# Patient Record
Sex: MALE | Race: WHITE
[De-identification: names, ages, dates, MRNs, and addresses within clinical notes are randomized per-mention and may not be internally consistent; named-entity substitution may affect disease eponyms.]

---

## 2017-04-21 ENCOUNTER — HOSPITAL ENCOUNTER (OUTPATIENT)
Dept: HOSPITAL 8 - CFH | Age: 57
Discharge: HOME | End: 2017-04-21
Attending: PHYSICIAN ASSISTANT
Payer: MEDICAID

## 2017-04-21 DIAGNOSIS — M40.294: ICD-10-CM

## 2017-04-21 DIAGNOSIS — S22.030D: Primary | ICD-10-CM
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
## 2017-04-21 DIAGNOSIS — S92.344A: ICD-10-CM

## 2017-04-21 DIAGNOSIS — S22.050D: ICD-10-CM

## 2017-04-21 DIAGNOSIS — F32.9: ICD-10-CM

## 2017-04-21 DIAGNOSIS — S92.334A: ICD-10-CM

## 2017-04-21 DIAGNOSIS — M51.44: ICD-10-CM

## 2017-04-21 DIAGNOSIS — S22.040D: ICD-10-CM

## 2017-04-21 PROCEDURE — 77080 DXA BONE DENSITY AXIAL: CPT

## 2017-04-21 PROCEDURE — 72146 MRI CHEST SPINE W/O DYE: CPT

## 2017-06-01 ENCOUNTER — HOSPITAL ENCOUNTER (EMERGENCY)
Dept: HOSPITAL 8 - ED | Age: 57
Discharge: HOME | End: 2017-06-01
Payer: MEDICAID

## 2017-06-01 VITALS — SYSTOLIC BLOOD PRESSURE: 133 MMHG | DIASTOLIC BLOOD PRESSURE: 82 MMHG

## 2017-06-01 VITALS — HEIGHT: 64 IN | BODY MASS INDEX: 28.04 KG/M2 | WEIGHT: 164.24 LBS
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
## 2017-06-01 DIAGNOSIS — S60.416A: Primary | ICD-10-CM

## 2017-06-01 DIAGNOSIS — S60.414A: ICD-10-CM
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
## 2017-06-01 DIAGNOSIS — F17.210: ICD-10-CM

## 2017-06-01 DIAGNOSIS — S09.90XA: ICD-10-CM
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
## 2017-06-01 LAB
AST SERPL-CCNC: 12 U/L (ref 15–37)
BUN SERPL-MCNC: 14 MG/DL (ref 7–18)

## 2017-06-01 PROCEDURE — 80053 COMPREHEN METABOLIC PANEL: CPT

## 2017-06-01 PROCEDURE — 83970 ASSAY OF PARATHORMONE: CPT

## 2017-06-01 PROCEDURE — 82306 VITAMIN D 25 HYDROXY: CPT

## 2017-06-01 PROCEDURE — 99284 EMERGENCY DEPT VISIT MOD MDM: CPT

## 2017-06-01 PROCEDURE — 85025 COMPLETE CBC W/AUTO DIFF WBC: CPT

## 2017-06-01 PROCEDURE — 36415 COLL VENOUS BLD VENIPUNCTURE: CPT

## 2017-12-12 ENCOUNTER — HOSPITAL ENCOUNTER (EMERGENCY)
Dept: HOSPITAL 8 - ED | Age: 57
Discharge: HOME | End: 2017-12-12
Payer: MEDICAID

## 2017-12-12 VITALS — DIASTOLIC BLOOD PRESSURE: 78 MMHG | SYSTOLIC BLOOD PRESSURE: 114 MMHG

## 2017-12-12 VITALS — BODY MASS INDEX: 20.68 KG/M2 | HEIGHT: 68 IN | WEIGHT: 136.47 LBS

## 2017-12-12 DIAGNOSIS — M79.671: ICD-10-CM

## 2017-12-12 DIAGNOSIS — G40.909: ICD-10-CM

## 2017-12-12 DIAGNOSIS — M79.672: Primary | ICD-10-CM

## 2017-12-12 PROCEDURE — 99281 EMR DPT VST MAYX REQ PHY/QHP: CPT

## 2017-12-20 ENCOUNTER — HOSPITAL ENCOUNTER (EMERGENCY)
Dept: HOSPITAL 8 - ED | Age: 57
Discharge: HOME | End: 2017-12-20
Payer: MEDICAID

## 2017-12-20 VITALS — WEIGHT: 138.89 LBS | BODY MASS INDEX: 22.32 KG/M2 | HEIGHT: 66 IN

## 2017-12-20 VITALS — SYSTOLIC BLOOD PRESSURE: 110 MMHG | DIASTOLIC BLOOD PRESSURE: 65 MMHG

## 2017-12-20 DIAGNOSIS — F15.10: ICD-10-CM

## 2017-12-20 DIAGNOSIS — R53.83: Primary | ICD-10-CM

## 2017-12-20 DIAGNOSIS — M81.0: ICD-10-CM

## 2017-12-20 DIAGNOSIS — G40.909: ICD-10-CM

## 2017-12-20 PROCEDURE — 99283 EMERGENCY DEPT VISIT LOW MDM: CPT

## 2017-12-22 ENCOUNTER — HOSPITAL ENCOUNTER (EMERGENCY)
Dept: HOSPITAL 8 - ED | Age: 57
Discharge: HOME | End: 2017-12-22
Payer: MEDICAID

## 2017-12-22 VITALS — DIASTOLIC BLOOD PRESSURE: 80 MMHG | SYSTOLIC BLOOD PRESSURE: 118 MMHG

## 2017-12-22 VITALS — HEIGHT: 67 IN | BODY MASS INDEX: 21.11 KG/M2 | WEIGHT: 134.48 LBS

## 2017-12-22 DIAGNOSIS — G40.909: Primary | ICD-10-CM

## 2017-12-22 DIAGNOSIS — Z76.0: ICD-10-CM

## 2017-12-22 PROCEDURE — 99284 EMERGENCY DEPT VISIT MOD MDM: CPT

## 2017-12-23 ENCOUNTER — HOSPITAL ENCOUNTER (EMERGENCY)
Dept: HOSPITAL 8 - ED | Age: 57
Discharge: HOME | End: 2017-12-23
Payer: MEDICAID

## 2017-12-23 VITALS — SYSTOLIC BLOOD PRESSURE: 129 MMHG | DIASTOLIC BLOOD PRESSURE: 74 MMHG

## 2017-12-23 VITALS — BODY MASS INDEX: 20.83 KG/M2 | HEIGHT: 67 IN | WEIGHT: 132.72 LBS

## 2017-12-23 DIAGNOSIS — Z76.0: Primary | ICD-10-CM

## 2017-12-23 DIAGNOSIS — F17.200: ICD-10-CM

## 2017-12-23 DIAGNOSIS — G40.909: ICD-10-CM

## 2017-12-23 PROCEDURE — 99282 EMERGENCY DEPT VISIT SF MDM: CPT

## 2018-07-30 ENCOUNTER — HOSPITAL ENCOUNTER (EMERGENCY)
Dept: HOSPITAL 8 - ED | Age: 58
Discharge: HOME | End: 2018-07-30
Payer: MEDICAID

## 2018-07-30 VITALS — WEIGHT: 146.17 LBS | HEIGHT: 67 IN | BODY MASS INDEX: 22.94 KG/M2

## 2018-07-30 VITALS — DIASTOLIC BLOOD PRESSURE: 66 MMHG | SYSTOLIC BLOOD PRESSURE: 112 MMHG

## 2018-07-30 DIAGNOSIS — Z76.0: Primary | ICD-10-CM

## 2018-07-30 DIAGNOSIS — G40.909: ICD-10-CM

## 2018-07-30 PROCEDURE — 99283 EMERGENCY DEPT VISIT LOW MDM: CPT

## 2018-08-04 ENCOUNTER — HOSPITAL ENCOUNTER (EMERGENCY)
Dept: HOSPITAL 8 - ED | Age: 58
Discharge: HOME | End: 2018-08-04
Payer: MEDICAID

## 2018-08-04 VITALS — BODY MASS INDEX: 23.1 KG/M2 | HEIGHT: 66 IN | WEIGHT: 143.74 LBS

## 2018-08-04 VITALS — SYSTOLIC BLOOD PRESSURE: 132 MMHG | DIASTOLIC BLOOD PRESSURE: 87 MMHG

## 2018-08-04 DIAGNOSIS — F15.20: Primary | ICD-10-CM

## 2018-08-04 DIAGNOSIS — F17.210: ICD-10-CM

## 2018-08-04 DIAGNOSIS — Z76.0: ICD-10-CM

## 2018-08-04 DIAGNOSIS — G40.909: ICD-10-CM

## 2018-08-04 PROCEDURE — 99406 BEHAV CHNG SMOKING 3-10 MIN: CPT

## 2018-08-04 PROCEDURE — 99283 EMERGENCY DEPT VISIT LOW MDM: CPT

## 2018-08-05 ENCOUNTER — HOSPITAL ENCOUNTER (EMERGENCY)
Dept: HOSPITAL 8 - ED | Age: 58
Discharge: HOME | End: 2018-08-05
Payer: MEDICAID

## 2018-08-05 VITALS — BODY MASS INDEX: 23.91 KG/M2 | HEIGHT: 65 IN | WEIGHT: 143.52 LBS

## 2018-08-05 VITALS — SYSTOLIC BLOOD PRESSURE: 120 MMHG | DIASTOLIC BLOOD PRESSURE: 77 MMHG

## 2018-08-05 DIAGNOSIS — G40.909: Primary | ICD-10-CM

## 2018-08-05 DIAGNOSIS — Z76.0: ICD-10-CM

## 2018-08-05 DIAGNOSIS — F17.200: ICD-10-CM

## 2018-08-05 PROCEDURE — 99283 EMERGENCY DEPT VISIT LOW MDM: CPT

## 2018-11-01 ENCOUNTER — HOSPITAL ENCOUNTER (EMERGENCY)
Dept: HOSPITAL 8 - ED | Age: 58
Discharge: HOME | End: 2018-11-01
Payer: MEDICAID

## 2018-11-01 VITALS — BODY MASS INDEX: 26.12 KG/M2 | WEIGHT: 176.37 LBS | HEIGHT: 69 IN

## 2018-11-01 VITALS — SYSTOLIC BLOOD PRESSURE: 137 MMHG | DIASTOLIC BLOOD PRESSURE: 78 MMHG

## 2018-11-01 DIAGNOSIS — F20.9: ICD-10-CM

## 2018-11-01 DIAGNOSIS — R10.84: ICD-10-CM

## 2018-11-01 DIAGNOSIS — Z72.9: ICD-10-CM

## 2018-11-01 DIAGNOSIS — R11.2: Primary | ICD-10-CM

## 2018-11-01 DIAGNOSIS — R19.7: ICD-10-CM

## 2018-11-01 LAB
ALBUMIN SERPL-MCNC: 3.6 G/DL (ref 3.4–5)
ALP SERPL-CCNC: 120 U/L (ref 45–117)
ALT SERPL-CCNC: 38 U/L (ref 12–78)
ANION GAP SERPL CALC-SCNC: 9 MMOL/L (ref 5–15)
BASOPHILS # BLD AUTO: 0.02 X10^3/UL (ref 0–0.1)
BASOPHILS NFR BLD AUTO: 0 % (ref 0–1)
BILIRUB SERPL-MCNC: 0.3 MG/DL (ref 0.2–1)
CALCIUM SERPL-MCNC: 8.4 MG/DL (ref 8.5–10.1)
CHLORIDE SERPL-SCNC: 104 MMOL/L (ref 98–107)
CREAT SERPL-MCNC: 0.83 MG/DL (ref 0.7–1.3)
CULTURE INDICATED?: NO
EOSINOPHIL # BLD AUTO: 0 X10^3/UL (ref 0–0.4)
EOSINOPHIL NFR BLD AUTO: 0 % (ref 1–7)
ERYTHROCYTE [DISTWIDTH] IN BLOOD BY AUTOMATED COUNT: 14 % (ref 9.4–14.8)
LYMPHOCYTES # BLD AUTO: 1.4 X10^3/UL (ref 1–3.4)
LYMPHOCYTES NFR BLD AUTO: 24 % (ref 22–44)
MCH RBC QN AUTO: 31.3 PG (ref 27.5–34.5)
MCHC RBC AUTO-ENTMCNC: 33.9 G/DL (ref 33.2–36.2)
MCV RBC AUTO: 92.1 FL (ref 81–97)
MD: NO
MICROSCOPIC: (no result)
MONOCYTES # BLD AUTO: 0.28 X10^3/UL (ref 0.2–0.8)
MONOCYTES NFR BLD AUTO: 5 % (ref 2–9)
NEUTROPHILS # BLD AUTO: 4.17 X10^3/UL (ref 1.8–6.8)
NEUTROPHILS NFR BLD AUTO: 71 % (ref 42–75)
PLATELET # BLD AUTO: 339 X10^3/UL (ref 130–400)
PMV BLD AUTO: 7.8 FL (ref 7.4–10.4)
PROT SERPL-MCNC: 7.4 G/DL (ref 6.4–8.2)
RBC # BLD AUTO: 5.02 X10^6/UL (ref 4.38–5.82)

## 2018-11-01 PROCEDURE — 99285 EMERGENCY DEPT VISIT HI MDM: CPT

## 2018-11-01 PROCEDURE — 93005 ELECTROCARDIOGRAM TRACING: CPT

## 2018-11-01 PROCEDURE — 36415 COLL VENOUS BLD VENIPUNCTURE: CPT

## 2018-11-01 PROCEDURE — 81003 URINALYSIS AUTO W/O SCOPE: CPT

## 2018-11-01 PROCEDURE — 80053 COMPREHEN METABOLIC PANEL: CPT

## 2018-11-01 PROCEDURE — 83690 ASSAY OF LIPASE: CPT

## 2018-11-01 PROCEDURE — 85025 COMPLETE CBC W/AUTO DIFF WBC: CPT

## 2019-03-30 ENCOUNTER — HOSPITAL ENCOUNTER (EMERGENCY)
Dept: HOSPITAL 8 - ED | Age: 59
Discharge: LEFT BEFORE DISCHARGE | End: 2019-03-30
Payer: MEDICAID

## 2019-03-30 VITALS — SYSTOLIC BLOOD PRESSURE: 150 MMHG | DIASTOLIC BLOOD PRESSURE: 78 MMHG

## 2019-03-30 VITALS — HEIGHT: 64 IN | BODY MASS INDEX: 22.96 KG/M2 | WEIGHT: 134.48 LBS

## 2019-03-30 DIAGNOSIS — F41.1: Primary | ICD-10-CM

## 2019-03-30 PROCEDURE — 99284 EMERGENCY DEPT VISIT MOD MDM: CPT

## 2019-03-30 NOTE — NUR
pt ambulating in hallway with steady gait, states that he feels better at this 
time and is leaving, encouraged to stay, refuse to sign ama.  To radiology

## 2019-04-30 ENCOUNTER — HOSPITAL ENCOUNTER (EMERGENCY)
Dept: HOSPITAL 8 - ED | Age: 59
Discharge: HOME | End: 2019-04-30
Payer: MEDICAID

## 2019-04-30 VITALS — DIASTOLIC BLOOD PRESSURE: 80 MMHG | SYSTOLIC BLOOD PRESSURE: 120 MMHG

## 2019-04-30 VITALS — BODY MASS INDEX: 22.78 KG/M2 | WEIGHT: 141.76 LBS | HEIGHT: 66 IN

## 2019-04-30 DIAGNOSIS — M79.641: ICD-10-CM

## 2019-04-30 DIAGNOSIS — G40.909: ICD-10-CM

## 2019-04-30 DIAGNOSIS — Z72.9: ICD-10-CM

## 2019-04-30 DIAGNOSIS — F17.200: ICD-10-CM

## 2019-04-30 DIAGNOSIS — M79.642: Primary | ICD-10-CM

## 2019-04-30 PROCEDURE — 99281 EMR DPT VST MAYX REQ PHY/QHP: CPT

## 2019-07-29 ENCOUNTER — HOSPITAL ENCOUNTER (EMERGENCY)
Dept: HOSPITAL 8 - ED | Age: 59
Discharge: HOME | End: 2019-07-29
Payer: MEDICAID

## 2019-07-29 VITALS — DIASTOLIC BLOOD PRESSURE: 67 MMHG | SYSTOLIC BLOOD PRESSURE: 119 MMHG

## 2019-07-29 VITALS — WEIGHT: 135.8 LBS | BODY MASS INDEX: 20.58 KG/M2 | HEIGHT: 68 IN

## 2019-07-29 DIAGNOSIS — Z76.0: ICD-10-CM

## 2019-07-29 DIAGNOSIS — G40.909: Primary | ICD-10-CM

## 2019-07-29 PROCEDURE — 99283 EMERGENCY DEPT VISIT LOW MDM: CPT

## 2019-12-06 ENCOUNTER — HOSPITAL ENCOUNTER (EMERGENCY)
Dept: HOSPITAL 8 - ED | Age: 59
Discharge: HOME | End: 2019-12-06
Payer: MEDICAID

## 2019-12-06 VITALS — DIASTOLIC BLOOD PRESSURE: 58 MMHG | SYSTOLIC BLOOD PRESSURE: 101 MMHG

## 2019-12-06 VITALS — HEIGHT: 67 IN | WEIGHT: 148.81 LBS | BODY MASS INDEX: 23.36 KG/M2

## 2019-12-06 DIAGNOSIS — R10.84: Primary | ICD-10-CM

## 2019-12-06 DIAGNOSIS — F17.200: ICD-10-CM

## 2019-12-06 DIAGNOSIS — J18.9: ICD-10-CM

## 2019-12-06 DIAGNOSIS — G40.909: ICD-10-CM

## 2019-12-06 LAB
ALBUMIN SERPL-MCNC: 3.3 G/DL (ref 3.4–5)
ALP SERPL-CCNC: 94 U/L (ref 45–117)
ALT SERPL-CCNC: 21 U/L (ref 12–78)
ANION GAP SERPL CALC-SCNC: 6 MMOL/L (ref 5–15)
BASOPHILS # BLD AUTO: 0.03 X10^3/UL (ref 0–0.1)
BASOPHILS NFR BLD AUTO: 1 % (ref 0–1)
BILIRUB SERPL-MCNC: 0.6 MG/DL (ref 0.2–1)
CALCIUM SERPL-MCNC: 8.8 MG/DL (ref 8.5–10.1)
CHLORIDE SERPL-SCNC: 108 MMOL/L (ref 98–107)
CREAT SERPL-MCNC: 0.75 MG/DL (ref 0.7–1.3)
CULTURE INDICATED?: NO
EOSINOPHIL # BLD AUTO: 0.31 X10^3/UL (ref 0–0.4)
EOSINOPHIL NFR BLD AUTO: 7 % (ref 1–7)
ERYTHROCYTE [DISTWIDTH] IN BLOOD BY AUTOMATED COUNT: 14 % (ref 9.4–14.8)
LYMPHOCYTES # BLD AUTO: 1.89 X10^3/UL (ref 1–3.4)
LYMPHOCYTES NFR BLD AUTO: 41 % (ref 22–44)
MCH RBC QN AUTO: 30.3 PG (ref 27.5–34.5)
MCHC RBC AUTO-ENTMCNC: 32.9 G/DL (ref 33.2–36.2)
MCV RBC AUTO: 91.9 FL (ref 81–97)
MD: NO
MICROSCOPIC: (no result)
MONOCYTES # BLD AUTO: 0.48 X10^3/UL (ref 0.2–0.8)
MONOCYTES NFR BLD AUTO: 10 % (ref 2–9)
NEUTROPHILS # BLD AUTO: 1.91 X10^3/UL (ref 1.8–6.8)
NEUTROPHILS NFR BLD AUTO: 41 % (ref 42–75)
PLATELET # BLD AUTO: 313 X10^3/UL (ref 130–400)
PMV BLD AUTO: 8.1 FL (ref 7.4–10.4)
PROT SERPL-MCNC: 7.1 G/DL (ref 6.4–8.2)
RBC # BLD AUTO: 4.5 X10^6/UL (ref 4.38–5.82)

## 2019-12-06 PROCEDURE — 80053 COMPREHEN METABOLIC PANEL: CPT

## 2019-12-06 PROCEDURE — 99284 EMERGENCY DEPT VISIT MOD MDM: CPT

## 2019-12-06 PROCEDURE — 85025 COMPLETE CBC W/AUTO DIFF WBC: CPT

## 2019-12-06 PROCEDURE — 74177 CT ABD & PELVIS W/CONTRAST: CPT

## 2019-12-06 PROCEDURE — 83880 ASSAY OF NATRIURETIC PEPTIDE: CPT

## 2019-12-06 PROCEDURE — 81003 URINALYSIS AUTO W/O SCOPE: CPT

## 2019-12-06 PROCEDURE — 36415 COLL VENOUS BLD VENIPUNCTURE: CPT

## 2020-01-05 ENCOUNTER — HOSPITAL ENCOUNTER (EMERGENCY)
Dept: HOSPITAL 8 - ED | Age: 60
Discharge: HOME | End: 2020-01-05
Payer: MEDICAID

## 2020-01-05 VITALS — BODY MASS INDEX: 23.39 KG/M2 | WEIGHT: 149.03 LBS | HEIGHT: 67 IN

## 2020-01-05 VITALS — DIASTOLIC BLOOD PRESSURE: 78 MMHG | SYSTOLIC BLOOD PRESSURE: 123 MMHG

## 2020-01-05 DIAGNOSIS — J06.9: ICD-10-CM

## 2020-01-05 DIAGNOSIS — J20.8: Primary | ICD-10-CM

## 2020-01-05 LAB
ALBUMIN SERPL-MCNC: 3.5 G/DL (ref 3.4–5)
ALP SERPL-CCNC: 119 U/L (ref 45–117)
ALT SERPL-CCNC: 18 U/L (ref 12–78)
ANION GAP SERPL CALC-SCNC: 6 MMOL/L (ref 5–15)
BASOPHILS # BLD AUTO: 0.02 X10^3/UL (ref 0–0.1)
BASOPHILS NFR BLD AUTO: 0 % (ref 0–1)
BILIRUB SERPL-MCNC: 0.3 MG/DL (ref 0.2–1)
CALCIUM SERPL-MCNC: 8.5 MG/DL (ref 8.5–10.1)
CHLORIDE SERPL-SCNC: 109 MMOL/L (ref 98–107)
CREAT SERPL-MCNC: 0.69 MG/DL (ref 0.7–1.3)
EOSINOPHIL # BLD AUTO: 0.28 X10^3/UL (ref 0–0.4)
EOSINOPHIL NFR BLD AUTO: 5 % (ref 1–7)
ERYTHROCYTE [DISTWIDTH] IN BLOOD BY AUTOMATED COUNT: 15.1 % (ref 9.4–14.8)
LYMPHOCYTES # BLD AUTO: 1.7 X10^3/UL (ref 1–3.4)
LYMPHOCYTES NFR BLD AUTO: 31 % (ref 22–44)
MCH RBC QN AUTO: 30.1 PG (ref 27.5–34.5)
MCHC RBC AUTO-ENTMCNC: 32.8 G/DL (ref 33.2–36.2)
MCV RBC AUTO: 91.9 FL (ref 81–97)
MD: NO
MONOCYTES # BLD AUTO: 0.56 X10^3/UL (ref 0.2–0.8)
MONOCYTES NFR BLD AUTO: 10 % (ref 2–9)
NEUTROPHILS # BLD AUTO: 3.02 X10^3/UL (ref 1.8–6.8)
NEUTROPHILS NFR BLD AUTO: 54 % (ref 42–75)
PLATELET # BLD AUTO: 272 X10^3/UL (ref 130–400)
PMV BLD AUTO: 7.7 FL (ref 7.4–10.4)
PROT SERPL-MCNC: 7.2 G/DL (ref 6.4–8.2)
RBC # BLD AUTO: 4.49 X10^6/UL (ref 4.38–5.82)
TROPONIN I SERPL-MCNC: < 0.015 NG/ML (ref 0–0.04)

## 2020-01-05 PROCEDURE — 99284 EMERGENCY DEPT VISIT MOD MDM: CPT

## 2020-01-05 PROCEDURE — 83880 ASSAY OF NATRIURETIC PEPTIDE: CPT

## 2020-01-05 PROCEDURE — 85025 COMPLETE CBC W/AUTO DIFF WBC: CPT

## 2020-01-05 PROCEDURE — 84484 ASSAY OF TROPONIN QUANT: CPT

## 2020-01-05 PROCEDURE — 71046 X-RAY EXAM CHEST 2 VIEWS: CPT

## 2020-01-05 PROCEDURE — 93005 ELECTROCARDIOGRAM TRACING: CPT

## 2020-01-05 PROCEDURE — 80053 COMPREHEN METABOLIC PANEL: CPT

## 2020-01-05 PROCEDURE — 36415 COLL VENOUS BLD VENIPUNCTURE: CPT

## 2020-01-05 NOTE — NUR
PT DOZING INTERMITTENTLY, AROUSES EASILY.  PT AWARE OF TO BE DC'D.  NO NEEDS 
EXPRESSED AT THIS TIME.

## 2020-01-05 NOTE — NUR
PT UPDATED ON POC.  PLACED ON MONITORS.  NO ACUTE DISTRESS NOTED.  NO NEEDS 
EXPRESSED AT THIS TIME.

## 2020-01-05 NOTE — NUR
CIARA CHARLES AT BEDSIDE TO RE-EVAL PT.  NO CHANGE IN CONDITION NOTED.  NO NEEDS 
ESPRESSED AT THIS TIME.

## 2020-01-05 NOTE — NUR
CONTACT WITH PT, 59 YR OLD MALE HERE WITH C/O "FOR THE LAST WEEK, EVERY TIME I 
LAY DOWN I START COUGHING AND IT WONT QUIT"

## 2021-03-16 ENCOUNTER — HOSPITAL ENCOUNTER (EMERGENCY)
Dept: HOSPITAL 8 - ED | Age: 61
Discharge: HOME | End: 2021-03-16
Payer: COMMERCIAL

## 2021-03-16 VITALS — HEIGHT: 64 IN | WEIGHT: 150.8 LBS | BODY MASS INDEX: 25.74 KG/M2

## 2021-03-16 VITALS — DIASTOLIC BLOOD PRESSURE: 74 MMHG | SYSTOLIC BLOOD PRESSURE: 112 MMHG

## 2021-03-16 DIAGNOSIS — R11.0: Primary | ICD-10-CM

## 2021-03-16 DIAGNOSIS — G40.909: ICD-10-CM

## 2021-03-16 LAB
ALBUMIN SERPL-MCNC: 3.6 G/DL (ref 3.4–5)
ANION GAP SERPL CALC-SCNC: 4 MMOL/L (ref 5–15)
BASOPHILS # BLD AUTO: 0 X10^3/UL (ref 0–0.1)
BASOPHILS NFR BLD AUTO: 1 % (ref 0–1)
CALCIUM SERPL-MCNC: 8.4 MG/DL (ref 8.5–10.1)
CHLORIDE SERPL-SCNC: 108 MMOL/L (ref 98–107)
CREAT SERPL-MCNC: 0.69 MG/DL (ref 0.7–1.3)
EOSINOPHIL # BLD AUTO: 0.2 X10^3/UL (ref 0–0.4)
EOSINOPHIL NFR BLD AUTO: 4 % (ref 1–7)
ERYTHROCYTE [DISTWIDTH] IN BLOOD BY AUTOMATED COUNT: 14.7 % (ref 9.4–14.8)
LYMPHOCYTES # BLD AUTO: 1.3 X10^3/UL (ref 1–3.4)
LYMPHOCYTES NFR BLD AUTO: 29 % (ref 22–44)
MCH RBC QN AUTO: 30.9 PG (ref 27.5–34.5)
MCHC RBC AUTO-ENTMCNC: 33.9 G/DL (ref 33.2–36.2)
MD: NO
MONOCYTES # BLD AUTO: 0.4 X10^3/UL (ref 0.2–0.8)
MONOCYTES NFR BLD AUTO: 10 % (ref 2–9)
NEUTROPHILS # BLD AUTO: 2.5 X10^3/UL (ref 1.8–6.8)
NEUTROPHILS NFR BLD AUTO: 57 % (ref 42–75)
PLATELET # BLD AUTO: 312 X10^3/UL (ref 130–400)
PMV BLD AUTO: 7.5 FL (ref 7.4–10.4)
RBC # BLD AUTO: 5.06 X10^6/UL (ref 4.38–5.82)

## 2021-03-16 PROCEDURE — 36415 COLL VENOUS BLD VENIPUNCTURE: CPT

## 2021-03-16 PROCEDURE — 80048 BASIC METABOLIC PNL TOTAL CA: CPT

## 2021-03-16 PROCEDURE — 82040 ASSAY OF SERUM ALBUMIN: CPT

## 2021-03-16 PROCEDURE — 84443 ASSAY THYROID STIM HORMONE: CPT

## 2021-03-16 PROCEDURE — 85025 COMPLETE CBC W/AUTO DIFF WBC: CPT

## 2021-03-16 PROCEDURE — 80164 ASSAY DIPROPYLACETIC ACD TOT: CPT

## 2021-03-16 PROCEDURE — 80185 ASSAY OF PHENYTOIN TOTAL: CPT

## 2021-03-16 PROCEDURE — 99283 EMERGENCY DEPT VISIT LOW MDM: CPT

## 2021-03-16 NOTE — NUR
PT HAS CO NOT FEELING WELL, HEAT FLASHES FOR LAST COUPLE WEEKS, NAUSEA, NO 
VOMITING. 

no cough/sob